# Patient Record
Sex: FEMALE | Race: BLACK OR AFRICAN AMERICAN | URBAN - NONMETROPOLITAN AREA
[De-identification: names, ages, dates, MRNs, and addresses within clinical notes are randomized per-mention and may not be internally consistent; named-entity substitution may affect disease eponyms.]

---

## 2022-08-26 ENCOUNTER — TELEPHONE (OUTPATIENT)
Dept: NEUROSURGERY | Age: 37
End: 2022-08-26

## 2022-08-26 NOTE — TELEPHONE ENCOUNTER
[facility] New Patient Questionnaire    Diagnosis/Reason for Referral?  Other intervertebral disc degeneration, lumbar region  M99.13 (ICD-10-CM) - Subluxation complex (vertebral) of lumbar region      2. Who is completing questionnaire? Patient  X Caregiver Family      3. Has the patient had any previous spinal/brain surgeries? NO      A. If yes, what is the name of the facility in which the surgery was performed? B. Procedure/Surgery performed? C. Who was the surgeon? D. When was the surgery? MM/YY       E. Did the patient improve after the surgery? 4. Is this a second opinion? If yes, Dr. Harley Stands would like to review patient first before making the appointment. 5. Have MRI Images been obtain within the last year? Yes X No      XR X CT     If yes, where was the imaging performed? [facility]     If yes, what part of the body? Lumbar X Cervical  Thoracic  Brain     If yes, when was it obtained? 08-     Note: if the scan was performed at a facility other than [facility], the disc will need to be brought to the appointment or we need to reach out to obtain the disc. A. Was the patient instructed to provide the disc? Yes  X No      8. Has the patient had a NCV/EMG within the last year? Yes  No X     If yes, where was it performed and date? MM/YY  Location:      9. Has the patient been to Physical Therapy? Yes  No X     If yes, what location, how long attended, and last visit? Location:        Therapy Lasted:    Date of Last Visit:      10. Has the patient been to Pain Management? Yes  No X     If yes, what location and last visit     Location:   Last Visit:   Is it helping?

## 2022-09-09 ENCOUNTER — TELEPHONE (OUTPATIENT)
Dept: NEUROSURGERY | Age: 37
End: 2022-09-09

## 2022-09-09 NOTE — TELEPHONE ENCOUNTER
Randi requests that office  return their call. The best time to reach her is Anytime. Patient has Covid needs rescheduled her appointment/    Thank you.

## 2022-09-13 ENCOUNTER — TELEPHONE (OUTPATIENT)
Dept: NEUROSURGERY | Age: 37
End: 2022-09-13

## 2022-09-13 NOTE — TELEPHONE ENCOUNTER
Marcelle Oliver called and cancelled her appointment for 9/21/2022 due to her mother had an appointment with pain management and needed to take her to that appointment. Reschedule patient for 9/28/22 at 9:15.

## 2022-09-27 RX ORDER — FLUTICASONE PROPIONATE 50 MCG
SPRAY, SUSPENSION (ML) NASAL
COMMUNITY
Start: 2022-09-10

## 2022-09-27 RX ORDER — CITALOPRAM 10 MG/1
TABLET ORAL
COMMUNITY
Start: 2022-09-10

## 2022-09-27 RX ORDER — PSEUDOEPHEDRINE HCL 60 MG/1
60 TABLET ORAL EVERY 4 HOURS PRN
COMMUNITY
End: 2022-10-07

## 2022-09-27 RX ORDER — LEVETIRACETAM 1000 MG/1
1000 TABLET ORAL 2 TIMES DAILY
COMMUNITY
End: 2022-10-07

## 2022-09-27 RX ORDER — OXCARBAZEPINE 150 MG/1
150 TABLET, FILM COATED ORAL 2 TIMES DAILY
COMMUNITY

## 2022-10-07 ENCOUNTER — OFFICE VISIT (OUTPATIENT)
Dept: NEUROSURGERY | Age: 37
End: 2022-10-07
Payer: MEDICAID

## 2022-10-07 ENCOUNTER — HOSPITAL ENCOUNTER (OUTPATIENT)
Dept: GENERAL RADIOLOGY | Age: 37
Discharge: HOME | End: 2022-10-07
Payer: MEDICAID

## 2022-10-07 VITALS
SYSTOLIC BLOOD PRESSURE: 80 MMHG | WEIGHT: 179 LBS | HEART RATE: 79 BPM | HEIGHT: 62 IN | OXYGEN SATURATION: 96 % | BODY MASS INDEX: 32.94 KG/M2 | DIASTOLIC BLOOD PRESSURE: 62 MMHG

## 2022-10-07 DIAGNOSIS — M54.41 CHRONIC MIDLINE LOW BACK PAIN WITH BILATERAL SCIATICA: ICD-10-CM

## 2022-10-07 DIAGNOSIS — G89.29 CHRONIC MIDLINE LOW BACK PAIN WITH BILATERAL SCIATICA: ICD-10-CM

## 2022-10-07 DIAGNOSIS — M43.17 SPONDYLOLISTHESIS AT L5-S1 LEVEL: Primary | ICD-10-CM

## 2022-10-07 DIAGNOSIS — M54.42 CHRONIC MIDLINE LOW BACK PAIN WITH BILATERAL SCIATICA: ICD-10-CM

## 2022-10-07 DIAGNOSIS — M43.17 SPONDYLOLISTHESIS AT L5-S1 LEVEL: ICD-10-CM

## 2022-10-07 DIAGNOSIS — M51.36 DDD (DEGENERATIVE DISC DISEASE), LUMBAR: ICD-10-CM

## 2022-10-07 PROCEDURE — 99204 OFFICE O/P NEW MOD 45 MIN: CPT | Performed by: NURSE PRACTITIONER

## 2022-10-07 PROCEDURE — 72120 X-RAY BEND ONLY L-S SPINE: CPT

## 2022-10-07 RX ORDER — TRAMADOL HYDROCHLORIDE 50 MG/1
TABLET ORAL
COMMUNITY

## 2022-10-07 ASSESSMENT — ENCOUNTER SYMPTOMS
RESPIRATORY NEGATIVE: 1
GASTROINTESTINAL NEGATIVE: 1
EYES NEGATIVE: 1
BACK PAIN: 1

## 2022-10-07 NOTE — PROGRESS NOTES
Review of Systems   Constitutional: Negative. HENT: Negative. Eyes: Negative. Respiratory: Negative. Cardiovascular: Negative. Gastrointestinal: Negative. Genitourinary: Negative. Musculoskeletal:  Positive for back pain. Skin: Negative. Neurological: Negative. Endo/Heme/Allergies: Negative. Psychiatric/Behavioral: Negative.

## 2022-10-07 NOTE — PROGRESS NOTES
[facility]  Office Visit      Chief Complaint   Patient presents with    New Patient     Pt reports lumbar pain, pain radiates into hips and bilateral legs       HISTORY OF PRESENT ILLNESS:    Eris Harper is a 44 y.o. female who presents with low back pain and BLE pain that started several years ago and was told all she had was arthritis. The pain does radiate into the BLE posterior thighs, and calves. Her pain is mostly located in the low back. The patient complains of numbness of the same distribution. States she has bilateral leg cramping especially lying bed. Her pain is not changed when going from a seated to standing position. Her pain is not changed with walking. Her pain is not changed when lying flat. Overall, indicative that the patient does not have a mechanical nature to their pain. She states that 80% of her pain is located in the back and 20% is leg pain. The patient has underwent a non-operative treatment course that has included:  NSAIDs (ibuprofen)  Tylenol  Muscle Relaxers (flexeril - just made her sleep)        Of note she does not use tobacco and does not take blood thinning medications. Past Medical History:   Diagnosis Date    Anxiety     Depression     Headache     Migraine     Seizures ([address]        Past Surgical History:   Procedure Laterality Date    APPENDECTOMY  2021    CHOLECYSTECTOMY  2000    COLONOSCOPY  11/2020    HYSTERECTOMY (CERVIX STATUS UNKNOWN)  2019    KNEE SURGERY Right 2020    TUBAL LIGATION  2000    UPPER GASTROINTESTINAL ENDOSCOPY         Current Outpatient Medications   Medication Sig Dispense Refill    traMADol (ULTRAM) 50 MG tablet Ultram 50 mg tablet   Take 1 tablet every 6 hours by oral route as needed.       citalopram (CELEXA) 10 MG tablet       fluticasone (FLONASE) 50 MCG/ACT nasal spray       OXcarbazepine (TRILEPTAL) 150 MG tablet Take 150 mg by mouth 2 times daily       No current facility-administered medications for this visit. Allergies:  Amoxicillin, Asa [aspirin], Keflex [cephalexin], and Sulfa antibiotics    Social History:   Social History     Tobacco Use   Smoking Status Never   Smokeless Tobacco Never     Social History     Substance and Sexual Activity   Alcohol Use None         Family History:   Family History   Problem Relation Age of Onset    Diabetes Father     Hypertension Father     Deep Vein Thrombosis Father        REVIEW OF SYSTEMS:  Constitutional: Negative. HENT: Negative. Eyes: Negative. Respiratory: Negative. Cardiovascular: Negative. Gastrointestinal: Negative. Genitourinary: Negative. Musculoskeletal:  Positive for back pain. Skin: Negative. Neurological: Negative. Endo/Heme/Allergies: Negative. Psychiatric/Behavioral: Negative. PHYSICAL EXAM:  Vitals:    10/07/22 1040   BP: 80/62   Pulse: 79   SpO2: 96%     Constitutional: appears well-developed and well-nourished. Eyes - conjunctiva normal.  Pupils react to light  Ear, nose, throat - hearing intact to finger rub, No scars, masses, or lesions over external nose or ears, no atrophy oftongue  Neck- symmetric, no masses noted, no jugular vein distension  Respiration- chest wall appears symmetric, good expansion, normal effort without use of accessory muscles  Musculoskeletal - no significant wasting of muscles noted, no bony deformities, gait no gross ataxia  Extremities- no clubbing, cyanosis oredema  Skin - warm, dry, and intact. No rash, erythema, or pallor.   Psychiatric - mood, affect, and behavior appear normal.     Neurologic Examination  Awake, Alert and oriented x 4  Normal speech pattern, following commands    Motor:  RIGHT:    iliopsoas 5/5    knee flexor 5/5    knee extension 5/5    EHL 5/5   dorsiflexion 5/5    plantar flexion 5/5    LEFT:      iliopsoas 5/5    knee flexor 5/5    knee extension 5/5    EHL 5/5   dorsiflexion 5/5    plantar flexion 5/5    No deficits to light touch or pinprick sensation  Reflexes are 2+ and symmetric  No myofacial tenderness to palpation  Slight Antalgic Gait pattern        DATA and IMAGING:    Nursing/pcp notes, imaging, labs, and vitals reviewed. PT,OT and/or speech notes reviewed    No results found for: WBC, HGB, HCT, MCV, PLTNo results found for: NA, K, CL, CO2, BUN, CREATININE, GLUCOSE, CALCIUM, PROT, LABALBU, BILITOT, ALKPHOS, AST, ALT, LABGLOM, GFRAA, AGRATIO, GLOBNo results found for: INR, PROTIME      XR Lumbar Spine Durwood Osman)  I have personally reviewed these images and my interpretation is:  DDD L5-S1  Questionable retrolisthesis of L5 on S1 that measures 3mm      ASSESSMENT:    Geronimo Patton is a 44 y.o. female with complaints of back pain and BLE pain. ICD-10-CM    1. Spondylolisthesis at L5-S1 level  M43.17 XR LUMBAR SPINE FLEXION AND EXTENSION ONLY     External Referral To Physical Therapy      2. DDD (degenerative disc disease), lumbar  M51.36 External Referral To Physical Therapy      3. Chronic midline low back pain with bilateral sciatica  M54.41 External Referral To Physical Therapy    M54.42     G89.29           PLAN:  I have discussed and reviewed the results of the XR lumbar spine with Ms. Evans at length. I explained that she does have pretty significant DDD at L5-S1 for her age. A questionable spondylolisthesis at L5 on S1 that could certainly be the culprit of her low back pain.  Given that she has not had many non-operative treatment I would like for her to try.      -Obtain XR lumbar flex/ex to rule out instability   -Start PT Durbrian Brewer)  -Follow up 2 months         Danny George, APRN

## 2022-12-09 ENCOUNTER — OFFICE VISIT (OUTPATIENT)
Dept: NEUROSURGERY | Age: 37
End: 2022-12-09
Payer: MEDICAID

## 2022-12-09 VITALS
RESPIRATION RATE: 18 BRPM | BODY MASS INDEX: 32.94 KG/M2 | HEART RATE: 64 BPM | DIASTOLIC BLOOD PRESSURE: 68 MMHG | OXYGEN SATURATION: 98 % | WEIGHT: 179 LBS | SYSTOLIC BLOOD PRESSURE: 108 MMHG | HEIGHT: 62 IN

## 2022-12-09 DIAGNOSIS — M51.36 DDD (DEGENERATIVE DISC DISEASE), LUMBAR: ICD-10-CM

## 2022-12-09 DIAGNOSIS — M54.41 CHRONIC MIDLINE LOW BACK PAIN WITH BILATERAL SCIATICA: ICD-10-CM

## 2022-12-09 DIAGNOSIS — G89.29 CHRONIC MIDLINE LOW BACK PAIN WITH BILATERAL SCIATICA: ICD-10-CM

## 2022-12-09 DIAGNOSIS — M54.42 CHRONIC MIDLINE LOW BACK PAIN WITH BILATERAL SCIATICA: ICD-10-CM

## 2022-12-09 DIAGNOSIS — M43.17 SPONDYLOLISTHESIS AT L5-S1 LEVEL: Primary | ICD-10-CM

## 2022-12-09 PROCEDURE — 99213 OFFICE O/P EST LOW 20 MIN: CPT | Performed by: NURSE PRACTITIONER

## 2022-12-09 RX ORDER — MONTELUKAST SODIUM 10 MG/1
10 TABLET ORAL DAILY
COMMUNITY
Start: 2022-12-08

## 2022-12-09 ASSESSMENT — ENCOUNTER SYMPTOMS
RESPIRATORY NEGATIVE: 1
BACK PAIN: 1
GASTROINTESTINAL NEGATIVE: 1
EYES NEGATIVE: 1

## 2022-12-09 NOTE — PROGRESS NOTES
APPENDECTOMY  2021    CHOLECYSTECTOMY  2000    COLONOSCOPY  11/2020    HYSTERECTOMY (CERVIX STATUS UNKNOWN)  2019    KNEE SURGERY Right 2020    TUBAL LIGATION  2000    UPPER GASTROINTESTINAL ENDOSCOPY         Current Outpatient Medications   Medication Sig Dispense Refill    montelukast (SINGULAIR) 10 MG tablet Take 10 mg by mouth daily      traMADol (ULTRAM) 50 MG tablet Ultram 50 mg tablet   Take 1 tablet every 6 hours by oral route as needed. citalopram (CELEXA) 10 MG tablet        No current facility-administered medications for this visit. Allergies:  Amoxicillin, Asa [aspirin], Keflex [cephalexin], and Sulfa antibiotics    Social History:   Social History     Tobacco Use   Smoking Status Never   Smokeless Tobacco Never     Social History     Substance and Sexual Activity   Alcohol Use None         Family History:   Family History   Problem Relation Age of Onset    Diabetes Father     Hypertension Father     Deep Vein Thrombosis Father        REVIEW OF SYSTEMS:  Constitutional: Negative. HENT: Negative. Eyes: Negative. Respiratory: Negative. Cardiovascular: Negative. Gastrointestinal: Negative. Genitourinary: Negative. Musculoskeletal:  Positive for back pain. Skin: Negative. Neurological: Negative. Endo/Heme/Allergies: Negative. Psychiatric/Behavioral: Negative. PHYSICAL EXAM:  Vitals:    12/09/22 0909   BP: 108/68   Pulse: 64   Resp: 18   SpO2: 98%     Constitutional: appears well-developed and well-nourished.    Eyes - conjunctiva normal.  Pupils react to light  Ear, nose, throat - hearing intact to finger rub, No scars, masses, or lesions over external nose or ears, no atrophy oftongue  Neck- symmetric, no masses noted, no jugular vein distension  Respiration- chest wall appears symmetric, good expansion, normal effort without use of accessory muscles  Musculoskeletal - no significant wasting of muscles noted, no bony deformities, gait no gross ataxia  Extremities- no clubbing, cyanosis oredema  Skin - warm, dry, and intact. No rash, erythema, or pallor. Psychiatric - mood, affect, and behavior appear normal.     Neurologic Examination  Awake, Alert and oriented x 4  Normal speech pattern, following commands    Motor:  RIGHT:    iliopsoas 5/5    knee flexor 5/5    knee extension 5/5    EHL 5/5   dorsiflexion 5/5    plantar flexion 5/5    LEFT:      iliopsoas 5/5    knee flexor 5/5    knee extension 5/5    EHL 5/5   dorsiflexion 5/5    plantar flexion 5/5    No deficits to light touch or pinprick sensation  Reflexes are 2+ and symmetric  No myofacial tenderness to palpation  Slight Antalgic Gait pattern        DATA and IMAGING:    Nursing/pcp notes, imaging, labs, and vitals reviewed. PT,OT and/or speech notes reviewed    No results found for: WBC, HGB, HCT, MCV, PLTNo results found for: NA, K, CL, CO2, BUN, CREATININE, GLUCOSE, CALCIUM, PROT, LABALBU, BILITOT, ALKPHOS, AST, ALT, LABGLOM, GFRAA, AGRATIO, GLOBNo results found for: INR, PROTIME      XR Lumbar Spine Jaime Bhupendra)  I have personally reviewed these images and my interpretation is:  DDD L5-S1  Questionable retrolisthesis of L5 on S1 that measures 3mm    Narrative   Finding: There is severe disc disease seen at L5-S1. There is a grade 1   retrolisthesis L5-S1 with no pars defect. With flexion extended there is no   evidence of any change in spondylolisthesis. The remaining intervertebral disc   spaces are normally maintained. The vertebral bodies are intact and alignment is   normal.The pedicles and articular facets are normally aligned. The transverse   processes, neural lamina and neural spine outlines are normal.The sacroiliac   joints are unremarkable. Impression   NO EVIDENCE OF FRACTURE   Grade 1 retrolisthesis of L5 over S1 with disc disease. There is no associated   pars defect   I have personally reviewed the images and my interpretation is:  Mild spondylolisthesis of L5 on S1 that measures 3-4mm and does not move with flexion and extension views      ASSESSMENT:    Lady Cook is a 44 y.o. female with complaints of back pain and BLE pain. ICD-10-CM    1. Spondylolisthesis at L5-S1 level  M43.17 External Referral To Physical Therapy      2. DDD (degenerative disc disease), lumbar  M51.36 External Referral To Physical Therapy      3. Chronic midline low back pain with bilateral sciatica  M54.41 External Referral To Physical Therapy    M54.42     G89.29             PLAN:  I am still going to recommend she continue with PT. I will replace the order and have it sent again.   Unfortunately, I do not think insurance will approve an MRI without having tried PT.    -Start tizanidine  -TENS unit   -Start PT (Yosi Benson)  -Follow up 2 months         CONSTANZA Shields

## 2022-12-09 NOTE — PROGRESS NOTES
Review of Systems   Constitutional: Negative. HENT: Negative. Eyes: Negative. Respiratory: Negative. Cardiovascular: Negative. Gastrointestinal: Negative. Genitourinary: Negative. Musculoskeletal:  Positive for back pain, joint pain and myalgias. Skin: Negative. Neurological:  Positive for tingling. Endo/Heme/Allergies: Negative. Psychiatric/Behavioral: Negative.

## 2023-02-10 ENCOUNTER — TELEPHONE (OUTPATIENT)
Dept: NEUROSURGERY | Age: 38
End: 2023-02-10

## 2023-02-10 ENCOUNTER — OFFICE VISIT (OUTPATIENT)
Dept: NEUROSURGERY | Age: 38
End: 2023-02-10
Payer: MEDICAID

## 2023-02-10 VITALS
HEART RATE: 61 BPM | DIASTOLIC BLOOD PRESSURE: 68 MMHG | WEIGHT: 179 LBS | HEIGHT: 62 IN | OXYGEN SATURATION: 98 % | BODY MASS INDEX: 32.94 KG/M2 | RESPIRATION RATE: 18 BRPM | SYSTOLIC BLOOD PRESSURE: 98 MMHG

## 2023-02-10 DIAGNOSIS — M51.36 DDD (DEGENERATIVE DISC DISEASE), LUMBAR: ICD-10-CM

## 2023-02-10 DIAGNOSIS — G89.29 CHRONIC MIDLINE LOW BACK PAIN WITH BILATERAL SCIATICA: ICD-10-CM

## 2023-02-10 DIAGNOSIS — M54.42 CHRONIC MIDLINE LOW BACK PAIN WITH BILATERAL SCIATICA: ICD-10-CM

## 2023-02-10 DIAGNOSIS — M54.41 CHRONIC MIDLINE LOW BACK PAIN WITH BILATERAL SCIATICA: ICD-10-CM

## 2023-02-10 DIAGNOSIS — M43.17 SPONDYLOLISTHESIS AT L5-S1 LEVEL: Primary | ICD-10-CM

## 2023-02-10 PROCEDURE — 99213 OFFICE O/P EST LOW 20 MIN: CPT | Performed by: NURSE PRACTITIONER

## 2023-02-10 RX ORDER — METHOCARBAMOL 750 MG/1
750 TABLET, FILM COATED ORAL 3 TIMES DAILY PRN
Qty: 90 TABLET | Refills: 0 | Status: SHIPPED | OUTPATIENT
Start: 2023-02-10 | End: 2023-03-12

## 2023-02-10 ASSESSMENT — ENCOUNTER SYMPTOMS
GASTROINTESTINAL NEGATIVE: 1
RESPIRATORY NEGATIVE: 1
BACK PAIN: 1
EYES NEGATIVE: 1

## 2023-02-10 NOTE — PROGRESS NOTES
[facility]  Office Visit      Chief Complaint   Patient presents with    Follow-up     Eval after PT     Back Pain     Patient states she has only been to 1 session due to her insurance not approving further sessions. She states she has been trying the exercises at home but has not noticed any change. She states she has not been sleeping. She has been having constant pain and having cramps in her legs. She states she has tried different medications but has not had any relief. Numbness     Patient states she does have tingling in her hips and the back of her thighs. 2/10/2023: Ms. Kimber Mejía returns to clinic today to discuss her progress with PT. She states that she went to one session of PT, something occurred with insurance and she has not attended anymore sessions and has been doing the stretches at home. She continues to have severe back pain, bilateral hip pains. She will occasionally have low back and BLE cramping. She states that her insurance would not cover the Tizanidine. She did receive the TENS unit and states that has not helped her pain. 12/09/2022: Ms. Kimber Mejía returns to clinic today discuss her progress with PT, however, she states she has not started, that Sharon Albarran did not call her. She states she feels worse than she did at our last visit, reporting that she fell about 1 week ago. Her pain is located in the  low back and will travel into the RIGHT posterior thigh and stops at the knee. She complains of paresthesias and numbness of her low back. Bending makes her back pain worse. HISTORY OF PRESENT ILLNESS:    Becky Dakins is a 44 y.o. female who presents with low back pain and BLE pain that started several years ago and was told all she had was arthritis. The pain does radiate into the BLE posterior thighs, and calves. Her pain is mostly located in the low back. The patient complains of numbness of the same distribution.   States she has bilateral leg cramping especially lying bed. Her pain is not changed when going from a seated to standing position. Her pain is not changed with walking. Her pain is not changed when lying flat. Overall, indicative that the patient does not have a mechanical nature to their pain. She states that 80% of her pain is located in the back and 20% is leg pain. The patient has underwent a non-operative treatment course that has included:  NSAIDs (ibuprofen)  Tylenol  Muscle Relaxers (flexeril - just made her sleep)  PT - Mary Kay Simms has been doing stretches at home for about 2 months      Of note she does not use tobacco and does not take blood thinning medications. Past Medical History:   Diagnosis Date    Anxiety     Depression     Headache     Migraine     Seizures ([address]        Past Surgical History:   Procedure Laterality Date    APPENDECTOMY  2021    CHOLECYSTECTOMY  2000    COLONOSCOPY  11/2020    HYSTERECTOMY (CERVIX STATUS UNKNOWN)  2019    KNEE SURGERY Right 2020    TUBAL LIGATION  2000    UPPER GASTROINTESTINAL ENDOSCOPY         Current Outpatient Medications   Medication Sig Dispense Refill    methocarbamol (ROBAXIN-750) 750 MG tablet Take 1 tablet by mouth 3 times daily as needed (spasms) 90 tablet 0    montelukast (SINGULAIR) 10 MG tablet Take 10 mg by mouth daily      traMADol (ULTRAM) 50 MG tablet Ultram 50 mg tablet   Take 1 tablet every 6 hours by oral route as needed. citalopram (CELEXA) 10 MG tablet        No current facility-administered medications for this visit.        Allergies:  Amoxicillin, Asa [aspirin], Keflex [cephalexin], and Sulfa antibiotics    Social History:   Social History     Tobacco Use   Smoking Status Never   Smokeless Tobacco Never     Social History     Substance and Sexual Activity   Alcohol Use None         Family History:   Family History   Problem Relation Age of Onset    Diabetes Father     Hypertension Father     Deep Vein Thrombosis Father        REVIEW OF SYSTEMS:  Constitutional: Negative. HENT: Negative. Eyes: Negative. Respiratory: Negative. Cardiovascular: Negative. Gastrointestinal: Negative. Genitourinary: Negative. Musculoskeletal:  Positive for back pain. Skin: Negative. Neurological: Negative. Endo/Heme/Allergies: Negative. Psychiatric/Behavioral: Negative. PHYSICAL EXAM:  Vitals:    02/10/23 0853   BP: 98/68   Pulse: 61   Resp: 18   SpO2: 98%     Constitutional: appears well-developed and well-nourished. Eyes - conjunctiva normal.  Pupils react to light  Ear, nose, throat - hearing intact to finger rub, No scars, masses, or lesions over external nose or ears, no atrophy oftongue  Neck- symmetric, no masses noted, no jugular vein distension  Respiration- chest wall appears symmetric, good expansion, normal effort without use of accessory muscles  Musculoskeletal - no significant wasting of muscles noted, no bony deformities, gait no gross ataxia  Extremities- no clubbing, cyanosis oredema  Skin - warm, dry, and intact. No rash, erythema, or pallor. Psychiatric - mood, affect, and behavior appear normal.     Neurologic Examination  Awake, Alert and oriented x 4  Normal speech pattern, following commands    Motor:  RIGHT:    iliopsoas 5/5    knee flexor 5/5    knee extension 5/5    EHL 5/5   dorsiflexion 5/5    plantar flexion 5/5    LEFT:      iliopsoas 5/5    knee flexor 5/5    knee extension 5/5    EHL 5/5   dorsiflexion 5/5    plantar flexion 5/5    No deficits to light touch or pinprick sensation  Reflexes are 2+ and symmetric  No myofacial tenderness to palpation  Slight Antalgic Gait pattern        DATA and IMAGING:    Nursing/pcp notes, imaging, labs, and vitals reviewed.      PT,OT and/or speech notes reviewed    No results found for: WBC, HGB, HCT, MCV, PLTNo results found for: NA, K, CL, CO2, BUN, CREATININE, GLUCOSE, CALCIUM, PROT, LABALBU, BILITOT, ALKPHOS, AST, ALT, LABGLOM, GFRAA, AGRATIO, GLOBNo results found for: INR, PROTIME      XR Lumbar Spine Batool Buchanan)  I have personally reviewed these images and my interpretation is:  DDD L5-S1  Questionable retrolisthesis of L5 on S1 that measures 3mm    Narrative   Finding: There is severe disc disease seen at L5-S1. There is a grade 1   retrolisthesis L5-S1 with no pars defect. With flexion extended there is no   evidence of any change in spondylolisthesis. The remaining intervertebral disc   spaces are normally maintained. The vertebral bodies are intact and alignment is   normal.The pedicles and articular facets are normally aligned. The transverse   processes, neural lamina and neural spine outlines are normal.The sacroiliac   joints are unremarkable. Impression   NO EVIDENCE OF FRACTURE   Grade 1 retrolisthesis of L5 over S1 with disc disease. There is no associated   pars defect   I have personally reviewed the images and my interpretation is:  Mild spondylolisthesis of L5 on S1 that measures 3-4mm and does not move with flexion and extension views      ASSESSMENT:    Maisha Ramirez is a 44 y.o. female with complaints of back pain and BLE pain. ICD-10-CM    1. Spondylolisthesis at L5-S1 level  M43.17 MRI LUMBAR SPINE WO CONTRAST      2. DDD (degenerative disc disease), lumbar  M51.36 MRI LUMBAR SPINE WO CONTRAST      3.  Chronic midline low back pain with bilateral sciatica  M54.41 MRI LUMBAR SPINE WO CONTRAST    M54.42     G89.29           PLAN:  -Given that she has been doing at home stretching and exercises for 7 weeks with no relief I will go ahead and move forward with MRI lumbar spine.    -Start Robaxin   -Obtain MRI lumbar spine [facility]) To bring CD   -Follow up after imaging         CONSTANZA Mills

## 2023-03-01 ENCOUNTER — TELEPHONE (OUTPATIENT)
Dept: NEUROSURGERY | Age: 38
End: 2023-03-01

## 2023-03-01 NOTE — TELEPHONE ENCOUNTER
Patient called and lvm asking why we haven't scheduled her MRI. I called her back and stated that she requested to be sent to [facility]. She stated that she will call [facility].

## 2023-03-10 ENCOUNTER — OFFICE VISIT (OUTPATIENT)
Dept: NEUROSURGERY | Age: 38
End: 2023-03-10
Payer: MEDICAID

## 2023-03-10 VITALS
WEIGHT: 179 LBS | HEART RATE: 77 BPM | RESPIRATION RATE: 18 BRPM | SYSTOLIC BLOOD PRESSURE: 115 MMHG | DIASTOLIC BLOOD PRESSURE: 70 MMHG | HEIGHT: 62 IN | BODY MASS INDEX: 32.94 KG/M2

## 2023-03-10 DIAGNOSIS — M51.36 DDD (DEGENERATIVE DISC DISEASE), LUMBAR: ICD-10-CM

## 2023-03-10 DIAGNOSIS — M43.17 SPONDYLOLISTHESIS AT L5-S1 LEVEL: Primary | ICD-10-CM

## 2023-03-10 DIAGNOSIS — M54.41 CHRONIC MIDLINE LOW BACK PAIN WITH BILATERAL SCIATICA: ICD-10-CM

## 2023-03-10 DIAGNOSIS — M54.42 CHRONIC MIDLINE LOW BACK PAIN WITH BILATERAL SCIATICA: ICD-10-CM

## 2023-03-10 DIAGNOSIS — G89.29 CHRONIC MIDLINE LOW BACK PAIN WITH BILATERAL SCIATICA: ICD-10-CM

## 2023-03-10 PROCEDURE — 99213 OFFICE O/P EST LOW 20 MIN: CPT | Performed by: NURSE PRACTITIONER

## 2023-03-10 ASSESSMENT — ENCOUNTER SYMPTOMS
RESPIRATORY NEGATIVE: 1
EYES NEGATIVE: 1
GASTROINTESTINAL NEGATIVE: 1
BACK PAIN: 1

## 2023-03-10 NOTE — PROGRESS NOTES
[facility]  Office Visit      Chief Complaint   Patient presents with    Follow-up     Patient states that her back pain is worse than last visit. Results     Imaging pushed    Numbness     Patient states that she has BLE numbness and tingling. Denies weakness. 3/10/2023: Ms. Mayte Garcia returns to clinic today to review a new MRI lumbar spine. She states her back pain is worse and continues to have bilateral hip pains. 2/10/2023: Ms. Mayte Garcia returns to clinic today to discuss her progress with PT. She states that she went to one session of PT, something occurred with insurance and she has not attended anymore sessions and has been doing the stretches at home. She continues to have severe back pain, bilateral hip pains. She will occasionally have low back and BLE cramping. She states that her insurance would not cover the Tizanidine. She did receive the TENS unit and states that has not helped her pain. 12/09/2022: Ms. Mayte Garcia returns to clinic today discuss her progress with PT, however, she states she has not started, that Delfino Nolasco did not call her. She states she feels worse than she did at our last visit, reporting that she fell about 1 week ago. Her pain is located in the  low back and will travel into the RIGHT posterior thigh and stops at the knee. She complains of paresthesias and numbness of her low back. Bending makes her back pain worse. HISTORY OF PRESENT ILLNESS:    Leroy Shi is a 44 y.o. female who presents with low back pain and BLE pain that started several years ago and was told all she had was arthritis. The pain does radiate into the BLE posterior thighs, and calves. Her pain is mostly located in the low back. The patient complains of numbness of the same distribution. States she has bilateral leg cramping especially lying bed. Her pain is not changed when going from a seated to standing position.  Her pain is not changed with walking. Her pain is not changed when lying flat. Overall, indicative that the patient does not have a mechanical nature to their pain. She states that 80% of her pain is located in the back and 20% is leg pain. The patient has underwent a non-operative treatment course that has included:  NSAIDs (ibuprofen)  Tylenol  Muscle Relaxers (flexeril - just made her sleep)  PT - Barton Mortimer has been doing stretches at home for about 2 months      Of note she does not use tobacco and does not take blood thinning medications. Past Medical History:   Diagnosis Date    Anxiety     Depression     Headache     Migraine     Seizures ([address]        Past Surgical History:   Procedure Laterality Date    APPENDECTOMY  2021    CHOLECYSTECTOMY  2000    COLONOSCOPY  11/2020    HYSTERECTOMY (CERVIX STATUS UNKNOWN)  2019    KNEE SURGERY Right 2020    TUBAL LIGATION  2000    UPPER GASTROINTESTINAL ENDOSCOPY         Current Outpatient Medications   Medication Sig Dispense Refill    methocarbamol (ROBAXIN-750) 750 MG tablet Take 1 tablet by mouth 3 times daily as needed (spasms) 90 tablet 0    montelukast (SINGULAIR) 10 MG tablet Take 10 mg by mouth daily      traMADol (ULTRAM) 50 MG tablet Ultram 50 mg tablet   Take 1 tablet every 6 hours by oral route as needed. citalopram (CELEXA) 10 MG tablet        No current facility-administered medications for this visit. Allergies:  Amoxicillin, Asa [aspirin], Keflex [cephalexin], and Sulfa antibiotics    Social History:   Social History     Tobacco Use   Smoking Status Never   Smokeless Tobacco Never     Social History     Substance and Sexual Activity   Alcohol Use None         Family History:   Family History   Problem Relation Age of Onset    Diabetes Father     Hypertension Father     Deep Vein Thrombosis Father        REVIEW OF SYSTEMS:  Constitutional: Negative. HENT: Negative. Eyes: Negative. Respiratory: Negative. Cardiovascular: Negative. Gastrointestinal: Negative. Genitourinary: Negative. Musculoskeletal:  Positive for back pain. Skin: Negative. Neurological: Negative. Endo/Heme/Allergies: Negative. Psychiatric/Behavioral: Negative. PHYSICAL EXAM:  Vitals:    03/10/23 0903   BP: 115/70   Pulse: 77   Resp: 18       Constitutional: appears well-developed and well-nourished. Eyes - conjunctiva normal.  Pupils react to light  Ear, nose, throat - hearing intact to finger rub, No scars, masses, or lesions over external nose or ears, no atrophy oftongue  Neck- symmetric, no masses noted, no jugular vein distension  Respiration- chest wall appears symmetric, good expansion, normal effort without use of accessory muscles  Musculoskeletal - no significant wasting of muscles noted, no bony deformities, gait no gross ataxia  Extremities- no clubbing, cyanosis oredema  Skin - warm, dry, and intact. No rash, erythema, or pallor. Psychiatric - mood, affect, and behavior appear normal.     Neurologic Examination  Awake, Alert and oriented x 4  Normal speech pattern, following commands    Motor:  RIGHT:    iliopsoas 5/5    knee flexor 5/5    knee extension 5/5    EHL 5/5   dorsiflexion 5/5    plantar flexion 5/5    LEFT:      iliopsoas 5/5    knee flexor 5/5    knee extension 5/5    EHL 5/5   dorsiflexion 5/5    plantar flexion 5/5    No deficits to light touch or pinprick sensation  Reflexes are 2+ and symmetric  No myofacial tenderness to palpation  Slight Antalgic Gait pattern        DATA and IMAGING:    Nursing/pcp notes, imaging, labs, and vitals reviewed.      PT,OT and/or speech notes reviewed    No results found for: WBC, HGB, HCT, MCV, PLTNo results found for: NA, K, CL, CO2, BUN, CREATININE, GLUCOSE, CALCIUM, PROT, LABALBU, BILITOT, ALKPHOS, AST, ALT, LABGLOM, GFRAA, AGRATIO, GLOBNo results found for: INR, PROTIME      XR Lumbar Spine [place])  I have personally reviewed these images and my interpretation is:  DDD L5-S1  Questionable retrolisthesis of L5 on S1 that measures 3mm    Narrative   Finding: There is severe disc disease seen at L5-S1. There is a grade 1   retrolisthesis L5-S1 with no pars defect. With flexion extended there is no   evidence of any change in spondylolisthesis. The remaining intervertebral disc   spaces are normally maintained. The vertebral bodies are intact and alignment is   normal.The pedicles and articular facets are normally aligned. The transverse   processes, neural lamina and neural spine outlines are normal.The sacroiliac   joints are unremarkable. Impression   NO EVIDENCE OF FRACTURE   Grade 1 retrolisthesis of L5 over S1 with disc disease. There is no associated   pars defect   I have personally reviewed the images and my interpretation is:  Mild spondylolisthesis of L5 on S1 that measures 3-4mm and does not move with flexion and extension views      MRI Lumbar Spine (3/09/2023) Amy Raygoza  I have personally reviewed the images and my interpretation is:  DDD L5-S1  L5-S1 broad base disc bulge, however, no major neural element compresison         ASSESSMENT:    Nitza Braden is a 44 y.o. female with complaints of back pain and BLE pain. ICD-10-CM    1. Spondylolisthesis at L5-S1 level  M43.17       2. DDD (degenerative disc disease), lumbar  M51.36       3. Chronic midline low back pain with bilateral sciatica  M54.41     M54.42     G89.29             PLAN:  -I have discussed and reviewed the results of the MRI lumbar spine with Ms. Evans and her significant other at length. I explained that she does have DDD at L5-S1 which is most likely the culprit of her low back pain. There is no major neural element compression that would explain leg pains. Certainly no neurosurgical intervention recommended at this time.   Recommend she continue with non-operative treatments.    -Follow up as needed          CONSTANZA Felder

## 2023-03-11 ENCOUNTER — TRANSCRIBE ORDERS (OUTPATIENT)
Dept: ORTHOPEDIC SURGERY | Facility: CLINIC | Age: 38
End: 2023-03-11
Payer: MEDICAID

## 2023-03-11 DIAGNOSIS — L08.9 TOE ABRASION, INFECTED, LEFT, INITIAL ENCOUNTER: Primary | ICD-10-CM

## 2023-03-11 DIAGNOSIS — S90.415A TOE ABRASION, INFECTED, LEFT, INITIAL ENCOUNTER: Primary | ICD-10-CM

## 2023-03-14 ENCOUNTER — TELEPHONE (OUTPATIENT)
Dept: PODIATRY | Facility: CLINIC | Age: 38
End: 2023-03-14
Payer: MEDICAID

## 2023-03-14 ENCOUNTER — LAB (OUTPATIENT)
Dept: LAB | Facility: HOSPITAL | Age: 38
End: 2023-03-14
Payer: MEDICAID

## 2023-03-14 ENCOUNTER — OFFICE VISIT (OUTPATIENT)
Dept: PODIATRY | Facility: CLINIC | Age: 38
End: 2023-03-14
Payer: MEDICAID

## 2023-03-14 VITALS
HEART RATE: 78 BPM | DIASTOLIC BLOOD PRESSURE: 73 MMHG | HEIGHT: 62 IN | WEIGHT: 178 LBS | OXYGEN SATURATION: 98 % | SYSTOLIC BLOOD PRESSURE: 126 MMHG | BODY MASS INDEX: 32.76 KG/M2

## 2023-03-14 DIAGNOSIS — B35.1 ONYCHOMYCOSIS: ICD-10-CM

## 2023-03-14 DIAGNOSIS — Z13.1 SCREENING FOR DIABETES MELLITUS: ICD-10-CM

## 2023-03-14 DIAGNOSIS — M79.675 TOE PAIN, LEFT: Primary | ICD-10-CM

## 2023-03-14 DIAGNOSIS — B35.3 TINEA PEDIS OF LEFT FOOT: ICD-10-CM

## 2023-03-14 PROCEDURE — 80076 HEPATIC FUNCTION PANEL: CPT

## 2023-03-14 PROCEDURE — 85025 COMPLETE CBC W/AUTO DIFF WBC: CPT

## 2023-03-14 PROCEDURE — 1160F RVW MEDS BY RX/DR IN RCRD: CPT | Performed by: NURSE PRACTITIONER

## 2023-03-14 PROCEDURE — 83036 HEMOGLOBIN GLYCOSYLATED A1C: CPT

## 2023-03-14 PROCEDURE — 1159F MED LIST DOCD IN RCRD: CPT | Performed by: NURSE PRACTITIONER

## 2023-03-14 PROCEDURE — 99203 OFFICE O/P NEW LOW 30 MIN: CPT | Performed by: NURSE PRACTITIONER

## 2023-03-14 RX ORDER — MONTELUKAST SODIUM 10 MG/1
TABLET ORAL
COMMUNITY
Start: 2023-03-13

## 2023-03-14 RX ORDER — DOXYCYCLINE 100 MG/1
CAPSULE ORAL
COMMUNITY
Start: 2023-03-09

## 2023-03-14 NOTE — PROGRESS NOTES
"Mer Gorman  1985  37 y.o. female      03/14/2023    Chief Complaint   Patient presents with   • Left Foot - Pain       History of Present Illness    Mer Gorman is a 37 y.o.female presents to the clinic today for left fifth toe being tender to touch and discolored. Patient is on doxycycline. Pt states pain and discoloration have been present x 2+ years. Pt ambulating in office in regular footgear. Denies any trauma or injury to this area.      History reviewed. No pertinent past medical history.      History reviewed. No pertinent surgical history.      History reviewed. No pertinent family history.    Allergies   Allergen Reactions   • Aspirin Swelling   • Bactrim [Sulfamethoxazole-Trimethoprim] Unknown - Low Severity   • Penicillins Swelling       Social History     Socioeconomic History   • Marital status: Single         Current Outpatient Medications   Medication Sig Dispense Refill   • doxycycline (MONODOX) 100 MG capsule      • montelukast (SINGULAIR) 10 MG tablet        No current facility-administered medications for this visit.       Review of Systems   Constitutional: Negative.  Negative for chills and fever.   Respiratory: Negative.    Cardiovascular: Negative.    Musculoskeletal:        Toe pain   Skin: Positive for color change.        Darkening of L 5th toe   Neurological: Negative.    Psychiatric/Behavioral: Negative.          OBJECTIVE    /73   Pulse 78   Ht 157.5 cm (62\")   Wt 80.7 kg (178 lb)   SpO2 98%   BMI 32.56 kg/m²     Physical Exam  Vitals reviewed.   Constitutional:       General: She is not in acute distress.     Appearance: Normal appearance.   HENT:      Head: Normocephalic.   Eyes:      Pupils: Pupils are equal, round, and reactive to light.   Cardiovascular:      Pulses:           Dorsalis pedis pulses are 2+ on the right side and 2+ on the left side.        Posterior tibial pulses are 2+ on the right side and 2+ on the left side.   Pulmonary:      Effort: No " respiratory distress.   Musculoskeletal:         General: No signs of injury.      Cervical back: Neck supple.        Feet:    Feet:      Right foot:      Toenail Condition: Right toenails are abnormally thick. Fungal disease present.     Left foot:      Toenail Condition: Left toenails are abnormally thick. Fungal disease present.     Comments: 5th toenail b/l thickened and fungal loaded with +TTP.  Skin:     General: Skin is warm and dry.      Findings: No erythema.   Neurological:      General: No focal deficit present.      Mental Status: She is alert.   Psychiatric:         Mood and Affect: Mood normal.         Behavior: Behavior normal.              Procedures        ASSESSMENT AND PLAN    Diagnoses and all orders for this visit:    1. Toe pain, left (Primary)    2. Screening for diabetes mellitus    3. Tinea pedis of left foot    4. Onychomycosis        - Arterial status adequate based on exam findings of palpable pulses.  - RX for Lotrisone and Lamisil discussed for tinea infection.  - Updating labs prior to Lamisil.  - Moisture wicking socks.  - Lysol to shoes after wearing.  - F/u PRN or if no improvement in symptoms.  - All questions answered.          This document has been electronically signed by OSWALDO Delacruz on March 14, 2023 14:08 CDT

## 2023-03-14 NOTE — TELEPHONE ENCOUNTER
PT REQUESTS A CALL BACK RE WANTS TO KNOW WHEN LAB RESULTS COME BACK.  CALL BACK # [phone].  THANK YOU.

## 2023-03-14 NOTE — TELEPHONE ENCOUNTER
PT ALSO WANTS TO KNOW IF SHE CAN GET A WORK EXCUSE FOR TODAYS VISIT.  CALL BACK # [phone].  THANK YOU

## 2023-03-15 LAB
ALBUMIN SERPL-MCNC: 3.8 G/DL (ref 3.5–5.2)
ALP SERPL-CCNC: 51 U/L (ref 39–117)
ALT SERPL W P-5'-P-CCNC: 13 U/L (ref 1–33)
AST SERPL-CCNC: 21 U/L (ref 1–32)
BASOPHILS # BLD AUTO: 0.03 10*3/MM3 (ref 0–0.2)
BASOPHILS NFR BLD AUTO: 0.4 % (ref 0–1.5)
BILIRUB CONJ SERPL-MCNC: <0.2 MG/DL (ref 0–0.3)
BILIRUB INDIRECT SERPL-MCNC: NORMAL MG/DL
BILIRUB SERPL-MCNC: 0.3 MG/DL (ref 0–1.2)
DEPRECATED RDW RBC AUTO: 39 FL (ref 37–54)
EOSINOPHIL # BLD AUTO: 0.02 10*3/MM3 (ref 0–0.4)
EOSINOPHIL NFR BLD AUTO: 0.2 % (ref 0.3–6.2)
ERYTHROCYTE [DISTWIDTH] IN BLOOD BY AUTOMATED COUNT: 13 % (ref 12.3–15.4)
HBA1C MFR BLD: 5.2 % (ref 4.8–5.6)
HCT VFR BLD AUTO: 38.2 % (ref 34–46.6)
HGB BLD-MCNC: 13 G/DL (ref 12–15.9)
IMM GRANULOCYTES # BLD AUTO: 0.04 10*3/MM3 (ref 0–0.05)
IMM GRANULOCYTES NFR BLD AUTO: 0.5 % (ref 0–0.5)
LYMPHOCYTES # BLD AUTO: 1.75 10*3/MM3 (ref 0.7–3.1)
LYMPHOCYTES NFR BLD AUTO: 20.7 % (ref 19.6–45.3)
MCH RBC QN AUTO: 28.3 PG (ref 26.6–33)
MCHC RBC AUTO-ENTMCNC: 34 G/DL (ref 31.5–35.7)
MCV RBC AUTO: 83.2 FL (ref 79–97)
MONOCYTES # BLD AUTO: 0.33 10*3/MM3 (ref 0.1–0.9)
MONOCYTES NFR BLD AUTO: 3.9 % (ref 5–12)
NEUTROPHILS NFR BLD AUTO: 6.3 10*3/MM3 (ref 1.7–7)
NEUTROPHILS NFR BLD AUTO: 74.3 % (ref 42.7–76)
NRBC BLD AUTO-RTO: 0 /100 WBC (ref 0–0.2)
PLATELET # BLD AUTO: 244 10*3/MM3 (ref 140–450)
PMV BLD AUTO: 11.2 FL (ref 6–12)
PROT SERPL-MCNC: 6.7 G/DL (ref 6–8.5)
RBC # BLD AUTO: 4.59 10*6/MM3 (ref 3.77–5.28)
WBC NRBC COR # BLD: 8.47 10*3/MM3 (ref 3.4–10.8)

## 2023-03-16 RX ORDER — CLOTRIMAZOLE AND BETAMETHASONE DIPROPIONATE 10; .64 MG/G; MG/G
1 CREAM TOPICAL 2 TIMES DAILY
Qty: 45 G | Refills: 1 | Status: SHIPPED | OUTPATIENT
Start: 2023-03-16

## 2023-03-16 RX ORDER — TERBINAFINE HYDROCHLORIDE 250 MG/1
250 TABLET ORAL DAILY
Qty: 21 TABLET | Refills: 0 | Status: SHIPPED | OUTPATIENT
Start: 2023-03-16